# Patient Record
(demographics unavailable — no encounter records)

---

## 2024-11-13 NOTE — ASSESSMENT
[FreeTextEntry1] : Ricardo is a 16-year-old male with ASD, juvenile idiopathic osteoporosis, and now spinal asymmetries, kyphosis.   Today's assessment was performed with the assistance of the patient's parent as an independent historian given the patient's age. Clinical findings and x-ray results were reviewed at length with the patient and parent. We discussed at length the natural history, etiology, pathoanatomy and treatment modalities of scoliosis/kyphosis with patient and parent. Patient's obtained radiographs IN BRACE are remarkable for increased kyphosis that is corrected from 62.1 degrees down to 48.2 degrees. Patient is demonstrating some evidence of maintaining correction out of the brace. Lateral films also show wedging of three consecutive vertebral bodies most likely contributed by his osteoporosis. Patient is age 16 and Risser 4. Given patient is nearing skeletal maturity, it is unlikely that their scoliotic curvature will continue to progress. As for his kyphosis, I am recommending continued TLSO to be worn 10-12 hours everyday and to use it snug. Brace weaning protocol discussed. The patient was evaluated for brace adjustments in office today for their TLSO brace by Krista. Explained to mother given his underlying developmental delay and sensory issues, it may take a year of adjustment to wear brace full time. The mother understands that the braces do not correct curves permanently and that there is a 30% risk of brace failure. Parents understand the risk of curve progression needing surgery. Surgery is usually recommended for curves 40-45 degrees or more. We will continue monitoring for progression at this time. I am recommending follow up in 4 months. Scoliosis PA XR's will be done both IN AND OUT of brace. At that time, we will assess whether he is ready to discontinue the brace.   If there is no evidence of maintaining some permanent correction, surgery may be warranted.  This may be discussed on the next follow up based on radiographs.  All questions and concerns were addressed today. Parent and patient verbalize understanding and agree with plan of care.  Documented by Maikel Reese acting as a scribe for Dr. Rios on 11/12/2024. 		  The above documentation completed by the scribe is an accurate record of both my words and actions.

## 2024-11-13 NOTE — PHYSICAL EXAM
[FreeTextEntry1] : Healthy appearing 16 year-old child. Awake, alert, in no acute distress. Pleasant and cooperative.  Eyes are clear with no sclera abnormalities. External ears, nose and mouth are clear.  Good respiratory effort with no audible wheezing without use of a stethoscope. Ambulates independently with no evidence of antalgia. Good coordination and balance. Able to get on and off exam table without difficulty.  Spine: Inspection of the skin reveals no cafe au lait spots or large birth marks. From behind, patient is well centered with head and shoulders appropriately aligned with pelvis.  Shoulders are even with no significant scapula or flank asymmetry. Spine is grossly midline and straight. On Gee's Forward Bend, there is a significant kyphotic dome. Not able to be passively correctable.  NTTP over spinous processes and paraspinal musculature. Full range of motion at cervical, thoracic and lumbar spine with no pain or difficulty. No pelvic obliquity. No LLD  LE: Skin clean and intact. No deformity or lymphedema. Full ROM bilateral hips, knees and ankles.  Neg SLR Neg MARY 5/5 motor strength in LE. SILT distally. Brisk symmetric reflexes at Patellar and Achilles' tendons No clonus. DP 2+, BCR < 2 seconds

## 2024-11-13 NOTE — HISTORY OF PRESENT ILLNESS
[FreeTextEntry1] : Ricardo is a 16-year-old male with ASD and juvenile idiopathic osteoporosis and Scheuermann's kyphosis who presents today with his mother for followup. Mother reports patient has been experiencing atraumatic back pain since 2021. Mother is unable to specify injury. As per mother, patient obtained MRI in December 2021 and of recent Nov 2022 revealing concern for compression fracture and osteoporosis. Mother states patient was followed with a pain management specialist and was recommended physical therapy. Patient went to physical therapy.   Today, 11/12/2024, mother reports that he is wearing his brace about 10 hours a day since last visit. TLSO brace was fabricated by [employer]. Patient is having issues with the handle of the brace. Mother is interested in adjustments to the brace. He has done physical therapist in the past for his atraumatic back pain. He is no longer in physical therapy as he is unable to receive insurance approval for more sessions. Patient denies any recent fevers, chills, or night sweats. He denies any back pain, radiating pain, numbness, tingling sensations, weakness to LE, radiating LE pain, or bladder/bowel dysfunction. Presents today for further management regarding the same.   Mother also notes child was diagnosed with juvenile idiopathic osteoporosis by genetics. Imaging of bone density demonstrated bone density is significantly below the expected range for age. They are currently following endocrinology who recommended treatment with vitamin D and B12 supplements.

## 2024-11-13 NOTE — REASON FOR VISIT
[Follow Up] : a follow up visit [Patient] : patient [Mother] : mother [FreeTextEntry1] : Scheuermann's kyphosis, back pain

## 2024-11-13 NOTE — DATA REVIEWED
[de-identified] : My review and interpretation of the radiologic studies: AP and lateral scoliosis XR were ordered, obtained, and independently reviewed in clinic today 11/12/24 IN BRACE depicting a right sided curve from T3-T7 measuring 7 degrees and a left sided curve from T9-L1 measuring 9 degrees. Patient is Risser 4; [place] 6. Kyphosis noted on lateral films corrected to 48.2 degrees from 62.1 degrees. Wedging of three consecutive vertebral bodies noted on lateral films.  No evidence of spondylolysis or spondylolisthesis.   AP and lateral scoliosis XR were ordered, obtained, and independently reviewed in clinic today 7/24/24 IN BRACE depicting a right sided curve from T3-T7 measuring 7 degrees and a left sided curve from T9-L1 measuring 9 degrees. Patient is Risser 4; Mayfield 5. Increased kyphosis noted on lateral films down to 59 degrees from 69 degrees. Wedging of three consecutive vertebral bodies noted on lateral films.  No evidence of spondylolysis or spondylolisthesis.   My review and interpretation of the radiologic studies: AP and lateral scoliosis XR were ordered, obtained, and independently reviewed in clinic today 3/12/24 IN BRACE depicting a right sided curve from T3-T7 measuring 7 degrees and a left sided curve from T9-L1 measuring 9 degrees. Patient is Risser 4; Mayfield 5. Increased kyphosis noted on lateral films down to 57 degrees from 69 degrees. Wedging of three consecutive vertebral bodies noted on lateral films.  No evidence of spondylolysis or spondylolisthesis.   AP and lateral spine radiographs were ordered, obtained, and independently reviewed in clinic on 11/28/2023 depicting a right sided curve from T3-T7 measuring 7 degrees and a left sided curve from T9-L1 measuring 9 degrees. Patient is Risser 4; Mayfield 5. Increased kyphosis noted on lateral films. Wedging of three consecutive vertebral bodies noted on lateral films.  No evidence of spondylolysis or spondylolisthesis.   MRI of thoracic and lumbar spine performed on 11/12/2022:  1. Multilevel vertebral body height loss with vertebral endplate concavity throughout the mid and lower thoracic spine, likely representing chronic compression deformities. There is anterior wedging of the T7-T9 vertebral bodies, with associated exaggeration of the normal thoracic kyphosis. Heterogeneous marrow signal is present throughout these levels, with patchy areas of T1 hyperintensity suggesting fatty marrow replacement. There is no evidence of marrow edema in the thoracic or lumbar spine to suggest acute compression fracture.  2. Scattered mild sponylotic degenerative changes, as described above, with no significant spinal canal or foraminal stenosis.  3. Nonspecific minimal prominence of the mid thoracic central spinal canal.   XR BONE DENSITY AXIAL   PROCEDURE DATE:  07/11/2022 IMPRESSION: Bone density is significantly below the expected range for age.  MRI Lumbar Spine performed on 12/07/2023: IMPRESSION:  1. Mild depression of superior T11 endplate (10% height loss), with suggestion of mild marrow edema, as detailed above, This may reflect mild stress injury or bone contusion/microtrabecular fracture. Artifactual signal is also possible. Correlation with point tenderness at this level is recommended. CT can be considered for further evaluation as clinically warranted at this more sensitive modality for nondisplaced fractures.  2. No significant spinal canal stenosis, disc herniation, or neuroforaminal narrowing within the lumbar spine. 
[de-identified] : My review and interpretation of the radiologic studies: AP and lateral scoliosis XR were ordered, obtained, and independently reviewed in clinic today 11/12/24 IN BRACE depicting a right sided curve from T3-T7 measuring 7 degrees and a left sided curve from T9-L1 measuring 9 degrees. Patient is Risser 4; Mayfield 6. Kyphosis noted on lateral films corrected to 48.2 degrees from 62.1 degrees. Wedging of three consecutive vertebral bodies noted on lateral films.  No evidence of spondylolysis or spondylolisthesis.   AP and lateral scoliosis XR were ordered, obtained, and independently reviewed in clinic today 7/24/24 IN BRACE depicting a right sided curve from T3-T7 measuring 7 degrees and a left sided curve from T9-L1 measuring 9 degrees. Patient is Risser 4; Mayfield 5. Increased kyphosis noted on lateral films down to 59 degrees from 69 degrees. Wedging of three consecutive vertebral bodies noted on lateral films.  No evidence of spondylolysis or spondylolisthesis.   My review and interpretation of the radiologic studies: AP and lateral scoliosis XR were ordered, obtained, and independently reviewed in clinic today 3/12/24 IN BRACE depicting a right sided curve from T3-T7 measuring 7 degrees and a left sided curve from T9-L1 measuring 9 degrees. Patient is Risser 4; Mayfield 5. Increased kyphosis noted on lateral films down to 57 degrees from 69 degrees. Wedging of three consecutive vertebral bodies noted on lateral films.  No evidence of spondylolysis or spondylolisthesis.   AP and lateral spine radiographs were ordered, obtained, and independently reviewed in clinic on 11/28/2023 depicting a right sided curve from T3-T7 measuring 7 degrees and a left sided curve from T9-L1 measuring 9 degrees. Patient is Risser 4; Mayfield 5. Increased kyphosis noted on lateral films. Wedging of three consecutive vertebral bodies noted on lateral films.  No evidence of spondylolysis or spondylolisthesis.   MRI of thoracic and lumbar spine performed on 11/12/2022:  1. Multilevel vertebral body height loss with vertebral endplate concavity throughout the mid and lower thoracic spine, likely representing chronic compression deformities. There is anterior wedging of the T7-T9 vertebral bodies, with associated exaggeration of the normal thoracic kyphosis. Heterogeneous marrow signal is present throughout these levels, with patchy areas of T1 hyperintensity suggesting fatty marrow replacement. There is no evidence of marrow edema in the thoracic or lumbar spine to suggest acute compression fracture.  2. Scattered mild sponylotic degenerative changes, as described above, with no significant spinal canal or foraminal stenosis.  3. Nonspecific minimal prominence of the mid thoracic central spinal canal.   XR BONE DENSITY AXIAL   PROCEDURE DATE:  07/11/2022 IMPRESSION: Bone density is significantly below the expected range for age.  MRI Lumbar Spine performed on 12/07/2023: IMPRESSION:  1. Mild depression of superior T11 endplate (10% height loss), with suggestion of mild marrow edema, as detailed above, This may reflect mild stress injury or bone contusion/microtrabecular fracture. Artifactual signal is also possible. Correlation with point tenderness at this level is recommended. CT can be considered for further evaluation as clinically warranted at this more sensitive modality for nondisplaced fractures.  2. No significant spinal canal stenosis, disc herniation, or neuroforaminal narrowing within the lumbar spine. 
1

## 2024-11-13 NOTE — BIRTH HISTORY
[Duration: ___ wks] : duration: [unfilled] weeks [Normal?] : normal delivery [___ lbs.] : [unfilled] lbs [Was child in NICU?] : Child was in NICU [FreeTextEntry7] : fluid in lungs - 5 days

## 2024-11-13 NOTE — HISTORY OF PRESENT ILLNESS
[FreeTextEntry1] : Ricardo is a 16-year-old male with ASD and juvenile idiopathic osteoporosis and Scheuermann's kyphosis who presents today with his mother for followup. Mother reports patient has been experiencing atraumatic back pain since 2021. Mother is unable to specify injury. As per mother, patient obtained MRI in December 2021 and of recent Nov 2022 revealing concern for compression fracture and osteoporosis. Mother states patient was followed with a pain management specialist and was recommended physical therapy. Patient went to physical therapy.   Today, 11/12/2024, mother reports that he is wearing his brace about 10 hours a day since last visit. TLSO brace was fabricated by [employer]. Patient is having issues with the handle of the brace. Mother is interested in adjustments to the brace. He has done physical therapist in the past for his atraumatic back pain. He is no longer in physical therapy as he is unable to receive insurance approval for more sessions. Patient denies any recent fevers, chills, or night sweats. He denies any back pain, radiating pain, numbness, tingling sensations, weakness to LE, radiating LE pain, or bladder/bowel dysfunction. Presents today for further management regarding the same.   Mother also notes child was diagnosed with juvenile idiopathic osteoporosis by genetics. Imaging of bone density demonstrated bone density is significantly below the expected range for age. They are currently following endocrinology who recommended treatment with vitamin D and B12 supplements.

## 2024-11-13 NOTE — REVIEW OF SYSTEMS
[ADHD] : ADHD [Change in Activity] : no change in activity [Fever Above 102] : no fever [Malaise] : no malaise [Rash] : no rash [Itching] : no itching [Shortness of Breath] : no shortness of breath [Limping] : no limping [Joint Pains] : no arthralgias [Joint Swelling] : no joint swelling [Back Pain] : ~T no back pain [Muscle Aches] : no muscle aches

## 2025-01-14 NOTE — PLAN
[Continue present medication regimen _____] : - Continue present medication regimen [unfilled] [Careful Teacher Selection] : - Next year's teacher(s) should be carefully selected to ensure a favorable fit [Resource Room] : - Provision of special education services in a resource room is recommended [ADHD EDU/Behav. Strategies (Gen)] : - Those educational and behavioral strategies known to be helpful to children with ADHD should be implemented in the classroom. [Instruction in Executive Function Skills] : - Direct, individualized instruction in executive function-related skills: i.e. task analysis, planning, organization, study strategies, memorization [Monitor Attention] : - [unfilled]'s attention skills will need to continue to be monitored [AIS for: _____] : - Academic Intervention Services for [unfilled] [Intensive Reading Instruction] : - Intensive reading instruction [Individual In-School Counseling] : - Individual counseling weekly with school psychologist or  [Social Skills] : - Social skills training [Social Skills Group (child)] : - Enrollment of child in a social skills development group [Psychotherapy (child)] : - Psychotherapy for child [Follow-up visit (med treatment monitoring): ____] : - Follow-up visit in [unfilled]  to evaluate response to medication and monitoring of medication treatment [CAPS] : - CAPS form completed 1-2 days before the visit. [Accuracy] : Accuracy and reliability of clinical impressions [Findings (To Date)] : Findings from evaluation (to date) [Clinical Basis] : Clinical basis for current diagnosis and clinical impressions [Goals / Benefits] : Goals & potential benefits of treatment with medication, as well as the limitations of pharmacotherapy [Stimulants] : Potential benefits and limitations of treatment with stimulant medication.  Potential adverse events were also reviewed, including insomnia, reduced appetite, change in blood pressure or heart rate, headache, stomachache, slowing of growth, moodiness, and onset of tics [Alpha-2s] : Potential benefits and limitations of treatment with alpha-2 agonists. Potential adverse events were also reviewed, including dry mouth, constipation, sedation, and change in blood pressure with potential for light-headedness when standing.  [Atomoxetine] : Potential benefits and limitations of treatment with atomoxetine. Potential adverse events were also reviewed, including sleepiness, gastrointestinal symptoms, change in blood pressure or heart rate, and suicidal thoughts. [Non-stimulants] : Potential benefits and limitations of treatment with non-stimulants.  Potential adverse events were also reviewed [CAM Therapies] : Benefits and limits of CAM therapies [Counseling] : Benefits and limits of counseling or therapy [Behavior Modification] : Behavior modification strategies [Family Questions] : Family's questions were addressed [Diet] : Evidence-based clinical information about diet [Sleep] : The importance of sleep and strategies to ensure adequate sleep [Media / Screen Time] : Importance of limiting electronics, media, and screen time [FreeTextEntry2] : video taped presentations may be used to reduce stress of oral presentations due to stutter.

## 2025-01-14 NOTE — REASON FOR VISIT
[Follow-Up Visit] : a follow-up visit for [ADHD] : ADHD [Autism Spectrum Disorder] : autism spectrum disorder [Patient] : patient [Mother] : mother [FreeTextEntry2] : Rciardo is doing welll academically. Ricardo is happy and content but prefers less socialization. Enmanuel has some stress due to exam requirements and academics. Ricardo is stable at school.Enmanuel participates in projects and online games but prefers to be alone socially/introverted. [TextEntry] : Telemedicine audiovisual 2 way follow up visit with consent. Mother and child in [facility]. DR Rangel in [facility].

## 2025-01-14 NOTE — HISTORY OF PRESENT ILLNESS
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

## 2025-03-13 NOTE — ASSESSMENT
[FreeTextEntry1] : Ricardo is a 16-year-old male with ASD, juvenile idiopathic osteoporosis, and now spinal asymmetries, kyphosis.   Today's assessment was performed with the assistance of the patient's parent as an independent historian given the patient's age. Clinical findings and x-ray results were reviewed at length with the patient and parent. We discussed at length the natural history, etiology, pathoanatomy and treatment modalities of scoliosis/kyphosis with patient and parent. Patient's obtained radiographs IN BRACE are remarkable for increased kyphosis that is 50.6 degrees. Patient is demonstrating some evidence of maintaining correction out of the brace. Lateral films also show wedging of three consecutive vertebral bodies most likely contributed by his osteoporosis. Patient is age 16 and Risser 4. Given patient is nearing skeletal maturity, it is unlikely that their scoliotic curvature will continue to progress. As for his kyphosis, I am recommending continued TLSO to be worn 10-12 hours every other day and to use it snug. Brace weaning protocol discussed. The patient was evaluated for brace adjustments in office today for their TLSO brace by Krista. Explained to mother given his underlying developmental delay and sensory issues, it may take a year of adjustment to wear brace full time. The mother understands that the braces do not correct curves permanently and that there is a 30% risk of brace failure. Parents understand the risk of curve progression needing surgery. Surgery is usually recommended for curves 40-45 degrees or more. We will continue monitoring for progression at this time. I am recommending follow up in 4 months. Scoliosis PA XR's will be done both IN AND OUT of brace. At that time, we will assess whether he is ready to discontinue the brace or further wean brace wearing.   If there is no evidence of maintaining some permanent correction, surgery may be warranted.  This may be discussed on the next follow up based on radiographs.  All questions and concerns were addressed today. Parent and patient verbalize understanding and agree with plan of care.  Documented by Laura Garber acting as a scribe for Dr. Rios on 03/11/2025. 		  The above documentation completed by the scribe is an accurate record of both my words and actions.

## 2025-03-13 NOTE — HISTORY OF PRESENT ILLNESS
[FreeTextEntry1] : Ricardo is a 16-year-old male with ASD and juvenile idiopathic osteoporosis and Scheuermann's kyphosis who presents today with his mother for followup. Mother reports patient has been experiencing atraumatic back pain since 2021. Mother is unable to specify injury. As per mother, patient obtained MRI in December 2021 and of recent Nov 2022 revealing concern for compression fracture and osteoporosis. Mother states patient was followed with a pain management specialist and was recommended physical therapy. Patient went to physical therapy.   Today, 3/11/2025, mother reports that he is wearing his brace about 10 hours a day since last visit. TLSO brace was fabricated by [employer]. Mother is not interested in adjustments to the brace. He has done physical therapist in the past for his atraumatic back pain. He is no longer in physical therapy as he is unable to receive insurance approval for more sessions. Patient denies any recent fevers, chills, or night sweats. He denies any back pain, radiating pain, numbness, tingling sensations, weakness to LE, radiating LE pain, or bladder/bowel dysfunction. Presents today for further management regarding the same.   Mother also notes child was diagnosed with juvenile idiopathic osteoporosis by genetics. Imaging of bone density demonstrated bone density is significantly below the expected range for age. They are currently following endocrinology who recommended treatment with vitamin D and B12 supplements.

## 2025-03-13 NOTE — DATA REVIEWED
[de-identified] : My review and interpretation of the radiologic studies: AP and lateral scoliosis XR were ordered, obtained, and independently reviewed in clinic today 03/11/25 IN BRACE depicting a right sided curve from T3-T7 measuring 7 degrees and a left sided curve from T9-L1 measuring 9 degrees. Patient is Risser 4; Mayfield 6. Kyphosis noted on lateral films corrected to 50.6. Wedging of three consecutive vertebral bodies noted on lateral films.  No evidence of spondylolysis or spondylolisthesis.   AP and lateral scoliosis XR were ordered, obtained, and independently reviewed in clinic today 11/12/24 IN BRACE depicting a right sided curve from T3-T7 measuring 7 degrees and a left sided curve from T9-L1 measuring 9 degrees. Patient is Risser 4; Mayfield 6. Kyphosis noted on lateral films corrected to 48.2 degrees from 62.1 degrees. Wedging of three consecutive vertebral bodies noted on lateral films.  No evidence of spondylolysis or spondylolisthesis.   AP and lateral scoliosis XR were ordered, obtained, and independently reviewed in clinic today 7/24/24 IN BRACE depicting a right sided curve from T3-T7 measuring 7 degrees and a left sided curve from T9-L1 measuring 9 degrees. Patient is Risser 4; Mayfield 5. Increased kyphosis noted on lateral films down to 59 degrees from 69 degrees. Wedging of three consecutive vertebral bodies noted on lateral films.  No evidence of spondylolysis or spondylolisthesis.   My review and interpretation of the radiologic studies: AP and lateral scoliosis XR were ordered, obtained, and independently reviewed in clinic today 3/12/24 IN BRACE depicting a right sided curve from T3-T7 measuring 7 degrees and a left sided curve from T9-L1 measuring 9 degrees. Patient is Risser 4; Mayfield 5. Increased kyphosis noted on lateral films down to 57 degrees from 69 degrees. Wedging of three consecutive vertebral bodies noted on lateral films.  No evidence of spondylolysis or spondylolisthesis.   AP and lateral spine radiographs were ordered, obtained, and independently reviewed in clinic on 11/28/2023 depicting a right sided curve from T3-T7 measuring 7 degrees and a left sided curve from T9-L1 measuring 9 degrees. Patient is Risser 4; Mayfield 5. Increased kyphosis noted on lateral films. Wedging of three consecutive vertebral bodies noted on lateral films.  No evidence of spondylolysis or spondylolisthesis.   MRI of thoracic and lumbar spine performed on 11/12/2022:  1. Multilevel vertebral body height loss with vertebral endplate concavity throughout the mid and lower thoracic spine, likely representing chronic compression deformities. There is anterior wedging of the T7-T9 vertebral bodies, with associated exaggeration of the normal thoracic kyphosis. Heterogeneous marrow signal is present throughout these levels, with patchy areas of T1 hyperintensity suggesting fatty marrow replacement. There is no evidence of marrow edema in the thoracic or lumbar spine to suggest acute compression fracture.  2. Scattered mild sponylotic degenerative changes, as described above, with no significant spinal canal or foraminal stenosis.  3. Nonspecific minimal prominence of the mid thoracic central spinal canal.   XR BONE DENSITY AXIAL   PROCEDURE DATE:  07/11/2022 IMPRESSION: Bone density is significantly below the expected range for age.  MRI Lumbar Spine performed on 12/07/2023: IMPRESSION:  1. Mild depression of superior T11 endplate (10% height loss), with suggestion of mild marrow edema, as detailed above, This may reflect mild stress injury or bone contusion/microtrabecular fracture. Artifactual signal is also possible. Correlation with point tenderness at this level is recommended. CT can be considered for further evaluation as clinically warranted at this more sensitive modality for nondisplaced fractures.  2. No significant spinal canal stenosis, disc herniation, or neuroforaminal narrowing within the lumbar spine.

## 2025-03-13 NOTE — HISTORY OF PRESENT ILLNESS
[FreeTextEntry1] : Ricardo is a 16-year-old male with ASD and juvenile idiopathic osteoporosis and Scheuermann's kyphosis who presents today with his mother for followup. Mother reports patient has been experiencing atraumatic back pain since 2021. Mother is unable to specify injury. As per mother, patient obtained MRI in December 2021 and of recent Nov 2022 revealing concern for compression fracture and osteoporosis. Mother states patient was followed with a pain management specialist and was recommended physical therapy. Patient went to physical therapy.   Today, 3/11/2025, mother reports that he is wearing his brace about 10 hours a day since last visit. TLSO brace was fabricated by [employer]. Mother is not interested in adjustments to the brace. He has done physical therapist in the past for his atraumatic back pain. He is no longer in physical therapy as he is unable to receive insurance approval for more sessions. Patient denies any recent fevers, chills, or night sweats. He denies any back pain, radiating pain, numbness, tingling sensations, weakness to LE, radiating LE pain, or bladder/bowel dysfunction. Presents today for further management regarding the same.   Mother also notes child was diagnosed with juvenile idiopathic osteoporosis by genetics. Imaging of bone density demonstrated bone density is significantly below the expected range for age. They are currently following endocrinology who recommended treatment with vitamin D and B12 supplements.

## 2025-03-13 NOTE — DATA REVIEWED
[de-identified] : My review and interpretation of the radiologic studies: AP and lateral scoliosis XR were ordered, obtained, and independently reviewed in clinic today 03/11/25 IN BRACE depicting a right sided curve from T3-T7 measuring 7 degrees and a left sided curve from T9-L1 measuring 9 degrees. Patient is Risser 4; Mayfield 6. Kyphosis noted on lateral films corrected to 50.6. Wedging of three consecutive vertebral bodies noted on lateral films.  No evidence of spondylolysis or spondylolisthesis.   AP and lateral scoliosis XR were ordered, obtained, and independently reviewed in clinic today 11/12/24 IN BRACE depicting a right sided curve from T3-T7 measuring 7 degrees and a left sided curve from T9-L1 measuring 9 degrees. Patient is Risser 4; Mayfield 6. Kyphosis noted on lateral films corrected to 48.2 degrees from 62.1 degrees. Wedging of three consecutive vertebral bodies noted on lateral films.  No evidence of spondylolysis or spondylolisthesis.   AP and lateral scoliosis XR were ordered, obtained, and independently reviewed in clinic today 7/24/24 IN BRACE depicting a right sided curve from T3-T7 measuring 7 degrees and a left sided curve from T9-L1 measuring 9 degrees. Patient is Risser 4; Mayfield 5. Increased kyphosis noted on lateral films down to 59 degrees from 69 degrees. Wedging of three consecutive vertebral bodies noted on lateral films.  No evidence of spondylolysis or spondylolisthesis.   My review and interpretation of the radiologic studies: AP and lateral scoliosis XR were ordered, obtained, and independently reviewed in clinic today 3/12/24 IN BRACE depicting a right sided curve from T3-T7 measuring 7 degrees and a left sided curve from T9-L1 measuring 9 degrees. Patient is Risser 4; Mayfield 5. Increased kyphosis noted on lateral films down to 57 degrees from 69 degrees. Wedging of three consecutive vertebral bodies noted on lateral films.  No evidence of spondylolysis or spondylolisthesis.   AP and lateral spine radiographs were ordered, obtained, and independently reviewed in clinic on 11/28/2023 depicting a right sided curve from T3-T7 measuring 7 degrees and a left sided curve from T9-L1 measuring 9 degrees. Patient is Risser 4; Mayfield 5. Increased kyphosis noted on lateral films. Wedging of three consecutive vertebral bodies noted on lateral films.  No evidence of spondylolysis or spondylolisthesis.   MRI of thoracic and lumbar spine performed on 11/12/2022:  1. Multilevel vertebral body height loss with vertebral endplate concavity throughout the mid and lower thoracic spine, likely representing chronic compression deformities. There is anterior wedging of the T7-T9 vertebral bodies, with associated exaggeration of the normal thoracic kyphosis. Heterogeneous marrow signal is present throughout these levels, with patchy areas of T1 hyperintensity suggesting fatty marrow replacement. There is no evidence of marrow edema in the thoracic or lumbar spine to suggest acute compression fracture.  2. Scattered mild sponylotic degenerative changes, as described above, with no significant spinal canal or foraminal stenosis.  3. Nonspecific minimal prominence of the mid thoracic central spinal canal.   XR BONE DENSITY AXIAL   PROCEDURE DATE:  07/11/2022 IMPRESSION: Bone density is significantly below the expected range for age.  MRI Lumbar Spine performed on 12/07/2023: IMPRESSION:  1. Mild depression of superior T11 endplate (10% height loss), with suggestion of mild marrow edema, as detailed above, This may reflect mild stress injury or bone contusion/microtrabecular fracture. Artifactual signal is also possible. Correlation with point tenderness at this level is recommended. CT can be considered for further evaluation as clinically warranted at this more sensitive modality for nondisplaced fractures.  2. No significant spinal canal stenosis, disc herniation, or neuroforaminal narrowing within the lumbar spine.

## 2025-04-01 NOTE — PLAN
[Continue present medication regimen _____] : - Continue present medication regimen [unfilled] [Careful Teacher Selection] : - Next year's teacher(s) should be carefully selected to ensure a favorable fit [Resource Room] : - Provision of special education services in a resource room is recommended [ADHD EDU/Behav. Strategies (Gen)] : - Those educational and behavioral strategies known to be helpful to children with ADHD should be implemented in the classroom. [Instruction in Executive Function Skills] : - Direct, individualized instruction in executive function-related skills: i.e. task analysis, planning, organization, study strategies, memorization [Monitor Attention] : - [unfilled]'s attention skills will need to continue to be monitored [AIS for: _____] : - Academic Intervention Services for [unfilled] [Intensive Reading Instruction] : - Intensive reading instruction [Individual In-School Counseling] : - Individual counseling weekly with school psychologist or  [Social Skills] : - Social skills training [Social Skills Group (child)] : - Enrollment of child in a social skills development group [Psychotherapy (child)] : - Psychotherapy for child [Follow-up visit (med treatment monitoring): ____] : - Follow-up visit in [unfilled]  to evaluate response to medication and monitoring of medication treatment [CAPS] : - CAPS form completed 1-2 days before the visit. [FreeTextEntry2] : video taped presentations may be used to reduce stress of oral presentations due to stutter. [Accuracy] : Accuracy and reliability of clinical impressions [Findings (To Date)] : Findings from evaluation (to date) [Clinical Basis] : Clinical basis for current diagnosis and clinical impressions [Goals / Benefits] : Goals & potential benefits of treatment with medication, as well as the limitations of pharmacotherapy [Stimulants] : Potential benefits and limitations of treatment with stimulant medication.  Potential adverse events were also reviewed, including insomnia, reduced appetite, change in blood pressure or heart rate, headache, stomachache, slowing of growth, moodiness, and onset of tics [Alpha-2s] : Potential benefits and limitations of treatment with alpha-2 agonists. Potential adverse events were also reviewed, including dry mouth, constipation, sedation, and change in blood pressure with potential for light-headedness when standing.  [Atomoxetine] : Potential benefits and limitations of treatment with atomoxetine. Potential adverse events were also reviewed, including sleepiness, gastrointestinal symptoms, change in blood pressure or heart rate, and suicidal thoughts. [Non-stimulants] : Potential benefits and limitations of treatment with non-stimulants.  Potential adverse events were also reviewed [CAM Therapies] : Benefits and limits of CAM therapies [Counseling] : Benefits and limits of counseling or therapy [Behavior Modification] : Behavior modification strategies [Family Questions] : Family's questions were addressed [Diet] : Evidence-based clinical information about diet [Sleep] : The importance of sleep and strategies to ensure adequate sleep [Media / Screen Time] : Importance of limiting electronics, media, and screen time

## 2025-04-01 NOTE — REVIEW OF SYSTEMS
[Motor Tics] : motor tics [Normal] : Psychiatric [de-identified] : vertebral fracture healed. Brace for scoliosis with stable spine curvature [FreeTextEntry8] : tics stable, occasional

## 2025-04-01 NOTE — REASON FOR VISIT
[Follow-Up Visit] : a follow-up visit for [ADHD] : ADHD [Autism Spectrum Disorder] : autism spectrum disorder [Patient] : patient [Mother] : mother [FreeTextEntry2] : Ricardo is doing welll academically. Ricardo is happy and content but prefers less socialization. Enmanuel has some stress due to exam requirements and academics. Ricardo is stable at school.Enmanuel participates in projects and online games but prefers to be alone socially/introverted. [TextEntry] : Telemedicine audiovisual 2 way follow up visit with consent. Mother and child in [facility]. DR Rangel in [facility].

## 2025-04-01 NOTE — PHYSICAL EXAM
[Normal] : patient has a normal gait [Attention Intact] : attention intact [Fidgets] : does not fidget [Well-behaved during visit] : well-behaved during visit [Appropriate eye contact] : appropriate eye contact [Smiles responsively] : smiles responsively [Positive mood] : positive mood [Answered questions appropriately] : answered questions appropriately [Responds to name] : responds to name [Able to follow one step commands] : able to follow one step commands [Echolalia] : no echolalia [Joint attention noted] : joint attention noted [Social referencing noted] : social referencing noted [de-identified] : Garciajohn engages in conversation and responds appropriately to questions. Some difficulty with dysarthria.

## 2025-07-02 NOTE — PLAN
[Continue present medication regimen _____] : - Continue present medication regimen [unfilled] [Careful Teacher Selection] : - Next year's teacher(s) should be carefully selected to ensure a favorable fit [ADHD EDU/Behav. Strategies (Gen)] : - Those educational and behavioral strategies known to be helpful to children with ADHD should be implemented in the classroom. [Instruction in Executive Function Skills] : - Direct, individualized instruction in executive function-related skills: i.e. task analysis, planning, organization, study strategies, memorization [Monitor Attention] : - [unfilled]'s attention skills will need to continue to be monitored [Individual In-School Counseling] : - Individual counseling weekly with school psychologist or  [Social Skills] : - Social skills training [Genetics] : - Medical Geneticist [Social Skills Group (child)] : - Enrollment of child in a social skills development group [Psychotherapy (child)] : - Psychotherapy for child [Fish Oil] : - Dietary supplementation with fish oil as a source of omega-3 fatty acids - guidelines and cautions discussed [Follow-up visit (med treatment monitoring): ____] : - Follow-up visit in [unfilled]  to evaluate response to medication and monitoring of medication treatment [CAPS] : - CAPS form completed 1-2 days before the visit. [IEP or IFSP] : - Copy of most recent Individualized Education Program (IEP) or Family Service Plan (IFSP) [Test reports] : - Reports of most recent psychological, educational, speech/language, PT, OT test results [Accuracy] : Accuracy and reliability of clinical impressions [Findings (To Date)] : Findings from evaluation (to date) [Clinical Basis] : Clinical basis for current diagnosis and clinical impressions [Goals / Benefits] : Goals & potential benefits of treatment with medication, as well as the limitations of pharmacotherapy [Stimulants] : Potential benefits and limitations of treatment with stimulant medication.  Potential adverse events were also reviewed, including insomnia, reduced appetite, change in blood pressure or heart rate, headache, stomachache, slowing of growth, moodiness, and onset of tics [Alpha-2s] : Potential benefits and limitations of treatment with alpha-2 agonists. Potential adverse events were also reviewed, including dry mouth, constipation, sedation, and change in blood pressure with potential for light-headedness when standing.  [Atomoxetine] : Potential benefits and limitations of treatment with atomoxetine. Potential adverse events were also reviewed, including sleepiness, gastrointestinal symptoms, change in blood pressure or heart rate, and suicidal thoughts. [Non-stimulants] : Potential benefits and limitations of treatment with non-stimulants.  Potential adverse events were also reviewed [Dev. Therapies: ____] : Benefits and limits of developmental therapies: [unfilled] [CAM Therapies] : Benefits and limits of CAM therapies [Counseling] : Benefits and limits of counseling or therapy [Behavior Modification] : Behavior modification strategies [Family Questions] : Family's questions were addressed [Diet] : Evidence-based clinical information about diet [Sleep] : The importance of sleep and strategies to ensure adequate sleep [Media / Screen Time] : Importance of limiting electronics, media, and screen time [FreeTextEntry8] : saffron, magnesium

## 2025-07-02 NOTE — HISTORY OF PRESENT ILLNESS
[Just Completed?] : just completed [Gen Ed: _____] : General Education class [unfilled] [Private Counseling: _____] : Private counseling [unfilled] [No Major Concerns] : No major concerns [No Side Effects] : no side effects [TWNoteComboBox1] : 11th Grade [de-identified] : concerns [de-identified] : needs assistance [de-identified] : concerns [Major Illness] : no major illness [Major Injury] : no major injury [Surgery] : no surgery [Hospitalizations] : no hospitalizations [New Medications] : no new medication [New Allergies] : no new allergies

## 2025-07-02 NOTE — REASON FOR VISIT
[Follow-Up Visit] : a follow-up visit for [ADHD] : ADHD [Autism Spectrum Disorder] : autism spectrum disorder [Other: ____] : [unfilled] [Patient] : patient [Mother] : mother [FreeTextEntry2] : Ricardo made honor role and is offered college scholarship for next year. Ricardo is doing very well academically in a small private setting and much parental support. Ricardo is challenged socially in peer group settings. Occasional brief intermittent tics occur.Back brace in place.

## 2025-07-02 NOTE — PHYSICAL EXAM
[Normal] : patient has a normal gait [Attention Intact] : attention intact [Well-behaved during visit] : well-behaved during visit [Appropriate eye contact] : appropriate eye contact [Smiles responsively] : smiles responsively [Positive mood] : positive mood [Answered questions appropriately] : answered questions appropriately [Responds to name] : responds to name [Able to follow one step commands] : able to follow one step commands [Joint attention noted] : joint attention noted [Social referencing noted] : social referencing noted [Fidgets] : does not fidget [Echolalia] : no echolalia [de-identified] : Enmanuel converses with back and forth with slow answers and hesitant conversation.

## 2025-07-08 NOTE — DATA REVIEWED
[de-identified] : My review and interpretation of the radiologic studies: AP and lateral scoliosis XR were ordered, obtained, and independently reviewed in clinic today 07/08/2025 IN BRACE depicting a right sided curve from T3-T7 measuring 7 degrees and a left sided curve from T9-L1 measuring 9 degrees. Patient is Risser 4; Mayfield 6. Kyphosis noted on lateral films measuring about 61 degrees. Wedging of three consecutive vertebral bodies noted on lateral films.  No evidence of spondylolysis or spondylolisthesis. AP and Lateral scoliosis XR OUT OF BRACE depict kyphosis correction to about 52 degrees  AP and lateral scoliosis XR were ordered, obtained, and independently reviewed in clinic today 03/11/25 IN BRACE depicting a right sided curve from T3-T7 measuring 7 degrees and a left sided curve from T9-L1 measuring 9 degrees. Patient is Risser 4; Mayfield 6. Kyphosis noted on lateral films corrected to 50.6. Wedging of three consecutive vertebral bodies noted on lateral films.  No evidence of spondylolysis or spondylolisthesis.   AP and lateral scoliosis XR were ordered, obtained, and independently reviewed in clinic today 11/12/24 IN BRACE depicting a right sided curve from T3-T7 measuring 7 degrees and a left sided curve from T9-L1 measuring 9 degrees. Patient is Risser 4; Mayfield 6. Kyphosis noted on lateral films corrected to 48.2 degrees from 62.1 degrees. Wedging of three consecutive vertebral bodies noted on lateral films.  No evidence of spondylolysis or spondylolisthesis.   AP and lateral scoliosis XR were ordered, obtained, and independently reviewed in clinic today 7/24/24 IN BRACE depicting a right sided curve from T3-T7 measuring 7 degrees and a left sided curve from T9-L1 measuring 9 degrees. Patient is Risser 4; Mayfield 5. Increased kyphosis noted on lateral films down to 59 degrees from 69 degrees. Wedging of three consecutive vertebral bodies noted on lateral films.  No evidence of spondylolysis or spondylolisthesis.   My review and interpretation of the radiologic studies: AP and lateral scoliosis XR were ordered, obtained, and independently reviewed in clinic today 3/12/24 IN BRACE depicting a right sided curve from T3-T7 measuring 7 degrees and a left sided curve from T9-L1 measuring 9 degrees. Patient is Risser 4; Mayfield 5. Increased kyphosis noted on lateral films down to 57 degrees from 69 degrees. Wedging of three consecutive vertebral bodies noted on lateral films.  No evidence of spondylolysis or spondylolisthesis.   AP and lateral spine radiographs were ordered, obtained, and independently reviewed in clinic on 11/28/2023 depicting a right sided curve from T3-T7 measuring 7 degrees and a left sided curve from T9-L1 measuring 9 degrees. Patient is Risser 4; Mayfield 5. Increased kyphosis noted on lateral films. Wedging of three consecutive vertebral bodies noted on lateral films.  No evidence of spondylolysis or spondylolisthesis.   MRI of thoracic and lumbar spine performed on 11/12/2022:  1. Multilevel vertebral body height loss with vertebral endplate concavity throughout the mid and lower thoracic spine, likely representing chronic compression deformities. There is anterior wedging of the T7-T9 vertebral bodies, with associated exaggeration of the normal thoracic kyphosis. Heterogeneous marrow signal is present throughout these levels, with patchy areas of T1 hyperintensity suggesting fatty marrow replacement. There is no evidence of marrow edema in the thoracic or lumbar spine to suggest acute compression fracture.  2. Scattered mild sponylotic degenerative changes, as described above, with no significant spinal canal or foraminal stenosis.  3. Nonspecific minimal prominence of the mid thoracic central spinal canal.   XR BONE DENSITY AXIAL   PROCEDURE DATE:  07/11/2022 IMPRESSION: Bone density is significantly below the expected range for age.  MRI Lumbar Spine performed on 12/07/2023: IMPRESSION:  1. Mild depression of superior T11 endplate (10% height loss), with suggestion of mild marrow edema, as detailed above, This may reflect mild stress injury or bone contusion/microtrabecular fracture. Artifactual signal is also possible. Correlation with point tenderness at this level is recommended. CT can be considered for further evaluation as clinically warranted at this more sensitive modality for nondisplaced fractures.  2. No significant spinal canal stenosis, disc herniation, or neuroforaminal narrowing within the lumbar spine.

## 2025-07-08 NOTE — ASSESSMENT
[FreeTextEntry1] : Helen is a 17-year-old male with ASD, juvenile idiopathic osteoporosis, spinal asymmetries, and Scheuermann's kyphosis.   Today's assessment was performed with the assistance of the patient's parent as an independent historian given the patient's age. Clinical findings and x-ray results were reviewed at length with the patient and parent. We discussed at length the natural history, etiology, pathoanatomy and treatment modalities of scoliosis/kyphosis with patient and parent. Patient's obtained radiographs OUT OF BRACE depict kyphosis measuring about 61 degrees. Patient's obtained radiographs IN BRACE depict correction to about 52 degrees. Lateral films also show wedging of three consecutive vertebral bodies most likely contributed by his osteoporosis. Patient is age 17 and Risser 4. Given patient is nearing skeletal maturity, it is unlikely that their scoliotic curvature will continue to progress. For his kyphosis, I am recommending patient continues current TLSO bracing schedule, wearing his brace every other night and to use it snug. The patient's brace was evaluated for fit and function in office today by Krista. Patient may begin returning to some activities, including swimming, walking and biking. However, patient should continue to remain out of gym and other intense physical activites.I am also recommending patient begin an at-home exercise regimen for back and core strengthening exercises. I have provided a sample sheet with exercises to be performed 5 days each week for 20-30 minutes each day. Sample exercises were demonstrated during today's visit. We will plan to see HELEN back in clinic in approximately 4 months for reevaluation with repeat AP and lateral scoliosis x-rays.   All questions and concerns were addressed. Patient and parent vocalized understanding and agreement to assessment and treatment plan.  Documented by Palma Gutierrez acting as a scribe for Dr. Rios on 07/08/2025.  The above documentation completed by the scribe is an accurate record of both my words and actions.

## 2025-07-08 NOTE — HISTORY OF PRESENT ILLNESS
[FreeTextEntry1] : Ricardo is a 17-year-old male with ASD, juvenile idiopathic osteoporosis, Scheuermann's kyphosis who presents today with his mother for followup. Mother reports patient has been experiencing atraumatic back pain since 2021. Mother is unable to specify injury. As per mother, patient obtained MRI in December 2021 and of recent Nov 2022 revealing concern for compression fracture and osteoporosis. Mother states patient was followed with a pain management specialist and was recommended physical therapy. Patient went to physical therapy but stopped due to insurance denial. Patient is also being followed by endocrinology for his osteoporosis and is being treated with vitamin D and B12 and tums supplements, taking it every day. At last visit on 3/11/2025, patient was wearing a TLSO brace for about 10 hours/day. Brace was fabricated by [employer]. Recommended weaning off bracing.  Today, patient reports that he has been wearing his brace every other night. However, mother reports that she has noticed significant growth in height and now feels that the brace is tight. Also noticed some breakdown of the brace. Otherwise patient is doing well with no complaints. He denies any recent fevers, chills or night sweats. Denies any recent trauma or injuries. He denies any back pain, radiating pain, numbness, tingling sensations, discomfort, weakness to the LE, radiating LE pain, or bladder/bowel dysfunction.  Here today for orthopedic management of the same.